# Patient Record
Sex: FEMALE | HISPANIC OR LATINO | URBAN - METROPOLITAN AREA
[De-identification: names, ages, dates, MRNs, and addresses within clinical notes are randomized per-mention and may not be internally consistent; named-entity substitution may affect disease eponyms.]

---

## 2019-06-07 ENCOUNTER — OFFICE VISIT (OUTPATIENT)
Dept: URBAN - METROPOLITAN AREA CLINIC 88 | Facility: CLINIC | Age: 44
End: 2019-06-07
Payer: MEDICAID

## 2019-06-07 DIAGNOSIS — H25.11 AGE-RELATED NUCLEAR CATARACT, RIGHT EYE: ICD-10-CM

## 2019-06-07 DIAGNOSIS — H04.123 DRY EYE SYNDROME OF BILATERAL LACRIMAL GLANDS: ICD-10-CM

## 2019-06-07 DIAGNOSIS — H52.13 MYOPIA, BILATERAL: ICD-10-CM

## 2019-06-07 DIAGNOSIS — H35.033 HYPERTENSIVE RETINOPATHY, BILATERAL: Primary | ICD-10-CM

## 2019-06-07 DIAGNOSIS — H11.153 PINGUECULA, BILATERAL: ICD-10-CM

## 2019-06-07 PROCEDURE — 99214 OFFICE O/P EST MOD 30 MIN: CPT | Performed by: OPTOMETRIST

## 2019-06-07 RX ORDER — METOPROLOL SUCCINATE 25 MG/1
25 MG TABLET, EXTENDED RELEASE ORAL
Qty: 0 | Refills: 0 | Status: INACTIVE
Start: 2019-06-07 | End: 2019-06-07

## 2019-06-07 ASSESSMENT — INTRAOCULAR PRESSURE
OD: 18
OS: 18

## 2019-06-07 ASSESSMENT — VISUAL ACUITY
OD: 20/25
OS: 20/25

## 2019-06-07 NOTE — IMPRESSION/PLAN
Impression: Dry eye syndrome of bilateral lacrimal glands: H04.123. Plan: Discussed condition in detail with patient. Explained condition does not have a cure and will need artificial tears for maintenance. Recommended artificial tears QID OU, warm compresses BID for 5+ mins OU, and Omega-3 supplement 1500mg PO QD. If symptoms persist, consider restasis and/or punctal occlusion.

## 2019-06-07 NOTE — IMPRESSION/PLAN
Impression: Hypertensive retinopathy, bilateral: H35.033. Plan: Findings were discussed in detail. The patient understands that there are mild hypertensive related changes taking place in their eyes. Patient understands the importance of monitoring blood pressure levels with their primary care physician to reduce the risk of hypertensive related vision loss. Diet and exercise are recommended.
